# Patient Record
Sex: FEMALE | Race: WHITE | URBAN - METROPOLITAN AREA
[De-identification: names, ages, dates, MRNs, and addresses within clinical notes are randomized per-mention and may not be internally consistent; named-entity substitution may affect disease eponyms.]

---

## 2017-06-13 ENCOUNTER — OFFICE VISIT (OUTPATIENT)
Dept: OBGYN CLINIC | Age: 22
End: 2017-06-13

## 2017-06-13 VITALS — SYSTOLIC BLOOD PRESSURE: 108 MMHG | WEIGHT: 156 LBS | DIASTOLIC BLOOD PRESSURE: 68 MMHG

## 2017-06-13 DIAGNOSIS — Z01.419 ENCOUNTER FOR GYNECOLOGICAL EXAMINATION WITHOUT ABNORMAL FINDING: Primary | ICD-10-CM

## 2017-06-13 RX ORDER — NORETHINDRONE ACETATE AND ETHINYL ESTRADIOL 1MG-20(21)
1 KIT ORAL DAILY
Qty: 30 TAB | Refills: 11 | Status: SHIPPED | OUTPATIENT
Start: 2017-06-13

## 2017-06-13 NOTE — PATIENT INSTRUCTIONS
Learning About Birth Control  What is birth control? Birth control is any method used to prevent pregnancy. Another word for birth control is contraception. If you have sex without birth control, there is a chance that you could get pregnant. This is true even if you have not started having periods yet or you are getting close to menopause. The only sure way to prevent pregnancy is to not have sex. But finding a good method of birth control that you are comfortable with can help you avoid an unplanned pregnancy. Be sure to tell your doctor about any health problems you have or medicines you take. He or she can help you choose the birth control method that is right for you. What are the types of birth control? There are many different kinds of birth control. Each has pros and cons. Learning about all the methods will help you find one that is right for you. · Hormonal methods are very good at preventing pregnancy. Combination birth control pills (\"the pill\"), skin patches, and vaginal rings release the hormones estrogen and progestin. Shots, mini-pills, and implants release progestin only. · Intrauterine devices (IUDs) are also very good at preventing pregnancy. A doctor must place the IUD in your uterus. There are two main types of IUDs available, the copper IUD and the hormonal IUD. The hormonal IUD releases progestin. · Barrier methods generally do not prevent pregnancy as well as IUDs or hormonal methods do. Barrier methods include condoms, diaphragms, cervical caps, and sponges. You must use barrier methods every time you have sex. · Natural family planning can work if you and your partner are very careful and you have a regular ovulation cycle. You will need to keep good records so you know when you are most likely to become pregnant (you are fertile). And during times you are fertile, you will need to not have sex or to use a barrier method.  Natural family planning is also known as fertility awareness and the rhythm method. · Permanent birth control (sterilization) gives you lasting protection against pregnancy. A man can have a vasectomy, or a woman can have her tubes tied (tubal ligation) or blocked (tubal implant). But this is only a good choice if you are sure that you don't want any (or any more) children. · Emergency contraception, such as the morning-after pill (Plan B), is a backup method to prevent pregnancy if you forget to use birth control or a condom breaks. You can use emergency contraception for up to 5 days after having had sex, but it works best if you take it right away. It is a good idea to keep emergency birth control on hand as backup protection. You can get emergency contraception without a prescription at most drugstores. How do you choose the best method? The best method of birth control is one that protects you every time you have sex. This usually depends on how well you use it. To find a method that will work best for you, think about:  · How well it works. Think about how important it is to you to avoid pregnancy. Then look at how well each method works. For example, if you plan to have a child soon anyway, you may not need a very reliable method. If you don't want children but feel it is wrong to end a pregnancy, choose a type of birth control that works very well. · How much effort it takes. For example, birth control pills may not be a good choice if you often forget to take medicine. Or, if you are not sure you will stop and use a barrier method each time you have sex, pick another method. · How much the method costs. For example, condoms are cheap or free in some clinics. Some insurance companies cover the cost of prescription birth control. But cost can sometimes be misleading. An IUD costs a lot up front. But it works for years, making it low-cost over time. · Whether it protects you from infection.  Latex condoms can help protect you from sexually transmitted infections (STIs), such as herpes or HIV/AIDS. But they are not the best way to prevent pregnancy. To avoid both STIs and pregnancy, use condoms along with another type of birth control. · Whether you've had a problem with one kind of birth control. Finding the best method of birth control may involve trying something different. Also, you may need to change a method that once worked well for you. · Whether you want children. If you are positive you don't want children, a lasting method of birth control might be best.  · Your health issues. Some birth control methods may not be safe for you, depending on your health issues. For example, women who smoke, are breastfeeding, or have had breast cancer may not be able to use certain methods. How can you get birth control? · You can buy:  ¨ Condoms, sponges, and spermicides without a prescription in drugstores, online, and in many grocery stores. ¨ Emergency contraception without a prescription at most drugstores. · You need to see a doctor to:  ¨ Get a prescription for birth control pills and other methods that use hormones. ¨ Have an IUD or implant inserted. ¨ Be fitted for a diaphragm or cervical cap. Where can you learn more? Go to http://teo-reed.info/. Enter X335 in the search box to learn more about \"Learning About Birth Control. \"  Current as of: May 30, 2016  Content Version: 11.2  © 4483-0712 Healthwise, Incorporated. Care instructions adapted under license by [employer] (which disclaims liability or warranty for this information). If you have questions about a medical condition or this instruction, always ask your healthcare professional. [place] [address] any warranty or liability for your use of this information.

## 2017-06-13 NOTE — PROGRESS NOTES
Annual exam ages 23-44    James Crocker is a No obstetric history on file. ,  23 y.o. female [facility] Patient's last menstrual period was 05/22/2017 (exact date). .    She presents for her annual checkup. Patient would like to discuss birth control options for acne, thinking about a pill. 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 With regard to the Gardasil vaccine, she has not received it yet. Menstrual status:    Her periods are moderate in flow. She denies dysmenorrhea. She reports no premenstrual symptoms. Contraception:    The current method of family planning is none. Sexual history:     She  reports that she does not engage in sexual activity. .    Patient has never had a pap smear or GYN exam.      Breast Cancer History/Substance Abuse: negative    No past medical history on file. No past surgical history on file. Allergies: Review of patient's allergies indicates not on file. Tobacco History:  reports that she has never smoked. She does not have any smokeless tobacco history on file. Alcohol Abuse:  reports that she does not drink alcohol. Drug Abuse:  reports that she does not use illicit drugs.     Family Medical/Cancer History:   Family History   Problem Relation Age of Onset    High Cholesterol Mother     High Cholesterol Father     Colon Cancer Other         Review of Systems - History obtained from the patient  Constitutional: negative for weight loss, fever, night sweats  HEENT: negative for hearing loss, earache, congestion, snoring, sorethroat  CV: negative for chest pain, palpitations, edema  Resp: negative for cough, shortness of breath, wheezing  GI: negative for change in bowel habits, abdominal pain, black or bloody stools  : negative for frequency, dysuria, hematuria, vaginal discharge  MSK: negative for back pain, joint pain, muscle pain  Breast: negative for breast lumps, nipple discharge, galactorrhea  Skin :negative for itching, rash, hives  Neuro: negative for dizziness, headache, confusion, weakness  Psych: negative for anxiety, depression, change in mood  Heme/lymph: negative for bleeding, bruising, pallor    Physical Exam    Visit Vitals    /68 (BP 1 Location: Left arm, BP Patient Position: Sitting)    Wt 156 lb (70.8 kg)    LMP 05/22/2017 (Exact Date)       Constitutional  · Appearance: well-nourished, well developed, alert, in no acute distress    HENT  · Head and Face: appears normal    Neck  · Inspection/Palpation: normal appearance, no masses or tenderness  · Lymph Nodes: no lymphadenopathy present  · Thyroid: gland size normal, nontender, no nodules or masses present on palpation    Chest  · Respiratory Effort: breathing unlabored    Breasts  · Inspection of Breasts: breasts symmetrical, no skin changes, no discharge present, nipple appearance normal, no skin retraction present  · Palpation of Breasts and Axillae: no masses present on palpation, no breast tenderness  · Axillary Lymph Nodes: no lymphadenopathy present    Gastrointestinal  ·  deferred    Skin  · General Inspection: no rash, no lesions identified    Neurologic/Psychiatric  · Mental Status:  · Orientation: grossly oriented to person, place and time  · Mood and Affect: mood normal, affect appropriate    . Assessment:  Routine gynecologic examination; desires ocp for acne  Her current medical status is satisfactory with no evidence of significant gynecologic issues.       Plan:  Script for low dose ocp called in; appropriate info shared  Counseled re: diet, exercise, healthy lifestyle  Return for yearly wellness visits  Gardisil counseling provided  Rec screening mammo at either 37 or 42

## 2017-06-13 NOTE — MR AVS SNAPSHOT
Visit Information Date & Time Provider Department Dept. Phone Encounter #  
 [id number]  8:19 AM Rafa Beard MD [facility] [id number] [facility] [phone] Upcoming Health Maintenance Date Due  
 HPV AGE 9Y-34Y (1 of 3 - Female 3 Dose Series) 2/21/2008 INFLUENZA AGE 9 TO ADULT 8/1/2017 Allergies as of 6/13/2017  Review Complete On: 6/13/2017 By: Maren León Not on File Current Immunizations  Never Reviewed No immunizations on file. Not reviewed this visit You Were Diagnosed With   
  
 Codes Comments Encounter for gynecological examination without abnormal finding    -  Primary ICD-10-CM: Y36.861 ICD-9-CM: V72.31 Vitals BP Weight(growth percentile) LMP OB Status Smoking Status 108/68 (BP 1 Location: Left arm, BP Patient Position: Sitting) 156 lb (70.8 kg) 05/22/2017 (Exact Date) Having regular periods Never Smoker Your Updated Medication List  
  
   
This list is accurate as of: 6/13/17  9:53 AM.  Always use your most recent med list.  
  
  
  
  
 VYVANSE 20 mg capsule Generic drug:  lisdexamfetamine Take by mouth daily. Patient Instructions Learning About Birth Control What is birth control? Birth control is any method used to prevent pregnancy. Another word for birth control is contraception. If you have sex without birth control, there is a chance that you could get pregnant. This is true even if you have not started having periods yet or you are getting close to menopause. The only sure way to prevent pregnancy is to not have sex. But finding a good method of birth control that you are comfortable with can help you avoid an unplanned pregnancy. Be sure to tell your doctor about any health problems you have or medicines you take. He or she can help you choose the birth control method that is right for you. What are the types of birth control? There are many different kinds of birth control. Each has pros and cons. Learning about all the methods will help you find one that is right for you. · Hormonal methods are very good at preventing pregnancy. Combination birth control pills (\"the pill\"), skin patches, and vaginal rings release the hormones estrogen and progestin. Shots, mini-pills, and implants release progestin only. · Intrauterine devices (IUDs) are also very good at preventing pregnancy. A doctor must place the IUD in your uterus. There are two main types of IUDs available, the copper IUD and the hormonal IUD. The hormonal IUD releases progestin. · Barrier methods generally do not prevent pregnancy as well as IUDs or hormonal methods do. Barrier methods include condoms, diaphragms, cervical caps, and sponges. You must use barrier methods every time you have sex. · Natural family planning can work if you and your partner are very careful and you have a regular ovulation cycle. You will need to keep good records so you know when you are most likely to become pregnant (you are fertile). And during times you are fertile, you will need to not have sex or to use a barrier method. Natural family planning is also known as fertility awareness and the rhythm method. · Permanent birth control (sterilization) gives you lasting protection against pregnancy. A man can have a vasectomy, or a woman can have her tubes tied (tubal ligation) or blocked (tubal implant). But this is only a good choice if you are sure that you don't want any (or any more) children. · Emergency contraception, such as the morning-after pill (Plan B), is a backup method to prevent pregnancy if you forget to use birth control or a condom breaks. You can use emergency contraception for up to 5 days after having had sex, but it works best if you take it right away. It is a good idea to keep emergency birth control on hand as backup protection.  You can get emergency contraception without a prescription at [facility]. How do you choose the best method? The best method of birth control is one that protects you every time you have sex. This usually depends on how well you use it. To find a method that will work best for you, think about: 
· How well it works. Think about how important it is to you to avoid pregnancy. Then look at how well each method works. For example, if you plan to have a child soon anyway, you may not need a very reliable method. If you don't want children but feel it is wrong to end a pregnancy, choose a type of birth control that works very well. · How much effort it takes. For example, birth control pills may not be a good choice if you often forget to take medicine. Or, if you are not sure you will stop and use a barrier method each time you have sex, pick another method. · How much the method costs. For example, condoms are cheap or free in some clinics. Some insurance companies cover the cost of prescription birth control. But cost can sometimes be misleading. An IUD costs a lot up front. But it works for years, making it low-cost over time. · Whether it protects you from infection. Latex condoms can help protect you from sexually transmitted infections (STIs), such as herpes or HIV/AIDS. But they are not the best way to prevent pregnancy. To avoid both STIs and pregnancy, use condoms along with another type of birth control. · Whether you've had a problem with one kind of birth control. Finding the best method of birth control may involve trying something different. Also, you may need to change a method that once worked well for you. · Whether you want children. If you are positive you don't want children, a lasting method of birth control might be best. 
· Your health issues. Some birth control methods may not be safe for you, depending on your health issues.  For example, women who smoke, are breastfeeding, or have had breast cancer may not be able to use certain methods. How can you get birth control? · You can buy: 
¨ Condoms, sponges, and spermicides without a prescription in drugstores, online, and in many grocery stores. ¨ Emergency contraception without a prescription at most drugstores. · You need to see a doctor to: ¨ Get a prescription for birth control pills and other methods that use hormones. ¨ Have an IUD or implant inserted. ¨ Be fitted for a diaphragm or cervical cap. Where can you learn more? Go to http://teo-reed.info/. Enter P420 in the search box to learn more about \"Learning About Birth Control. \" Current as of: May 30, 2016 Content Version: 11.2 © 2267-8250 [employer]. Care instructions adapted under license by [employer] (which disclaims liability or warranty for this information). If you have questions about a medical condition or this instruction, always ask your healthcare professional. [place] [address] any warranty or liability for your use of this information. Introducing [facility]! Ramez Kang introduces [employer] patient portal. Now you can access parts of your medical record, email your doctor's office, and request medication refills online. 1. In your internet browser, go to [email or web address] 2. Click on the First Time User? Click Here link in the Sign In box. You will see the New Member Sign Up page. 3. Enter your [employer] Access Code exactly as it appears below. You will not need to use this code after youve completed the sign-up process. If you do not sign up before the expiration date, you must request a new code. · [employer] Access Code: [email or web address] Expires: 9/11/2017  9:53 AM 
 
4. Enter the last four digits of your Social Security Number (xxxx) and Date of Birth (mm/dd/yyyy) as indicated and click Submit.  You will be taken to the next sign-up page. 5. Create a [employer] ID. This will be your [employer] login ID and cannot be changed, so think of one that is secure and easy to remember. 6. Create a [employer] password. You can change your password at any time. 7. Enter your Password Reset Question and Answer. This can be used at a later time if you forget your password. 8. Enter your e-mail address. You will receive e-mail notification when new information is available in [address]. 9. Click Sign Up. You can now view and download portions of your medical record. 10. Click the Download Summary menu link to download a portable copy of your medical information. If you have questions, please visit the Frequently Asked Questions section of the [employer] website. Remember, [employer] is NOT to be used for urgent needs. For medical emergencies, dial 911. Now available from your iPhone and Android! Please provide this summary of care documentation to your next provider. If you have any questions after today's visit, please call [phone].

## 2018-06-12 ENCOUNTER — TELEPHONE (OUTPATIENT)
Dept: OBGYN CLINIC | Age: 23
End: 2018-06-12

## 2018-06-12 NOTE — TELEPHONE ENCOUNTER
Received a fax request from [employer] [employer] [facility] [employer] for a refill on her birth control. Patient given two refills and its notated on the rx return script that she will need an appointment before receiving more refills.